# Patient Record
Sex: MALE | Race: WHITE | NOT HISPANIC OR LATINO | URBAN - METROPOLITAN AREA
[De-identification: names, ages, dates, MRNs, and addresses within clinical notes are randomized per-mention and may not be internally consistent; named-entity substitution may affect disease eponyms.]

---

## 2023-01-04 ENCOUNTER — OFFICE VISIT (OUTPATIENT)
Dept: URBAN - METROPOLITAN AREA CLINIC 10 | Facility: CLINIC | Age: 83
End: 2023-01-04
Payer: MEDICARE

## 2023-01-04 DIAGNOSIS — H35.3112 NONEXUDATIVE MACULAR DEGENERATION, INTERMEDIATE DRY STAGE, RIGHT EYE: ICD-10-CM

## 2023-01-04 DIAGNOSIS — H35.3124 NONEXUDATIVE MACULAR DEGENERATION, ADVANCED ATROPHIC WITH SUBFOVEAL INVOLVEMENT, LEFT EYE: ICD-10-CM

## 2023-01-04 DIAGNOSIS — H26.491 OTHER SECONDARY CATARACT, RIGHT EYE: Primary | ICD-10-CM

## 2023-01-04 PROCEDURE — 92134 CPTRZ OPH DX IMG PST SGM RTA: CPT | Performed by: OPTOMETRIST

## 2023-01-04 PROCEDURE — 99204 OFFICE O/P NEW MOD 45 MIN: CPT | Performed by: OPTOMETRIST

## 2023-01-04 ASSESSMENT — INTRAOCULAR PRESSURE
OD: 8
OS: 9

## 2023-01-04 ASSESSMENT — KERATOMETRY
OD: 41.50
OS: 41.38

## 2023-01-04 ASSESSMENT — VISUAL ACUITY
OD: 20/70
OS: 20/50

## 2023-01-04 NOTE — IMPRESSION/PLAN
Impression: Nonexudative macular degeneration, advanced atrophic with subfoveal involvement, left eye: H35.3166. Plan: Patient educated regarding findings. Recommend patient take an AREDS formula multiple vitamin daily PO. Recommend no smoking, increasing leafy green vegetables in diet, and UV protection. Recommend monocular home monitoring. Observation is all that is indicated at this time. RTC 1 year for DFE and possible MAC OCT or sooner with any sudden change in vision.

## 2023-01-04 NOTE — IMPRESSION/PLAN
Impression: Nonexudative macular degeneration, intermediate dry stage, right eye: H35.0764. Plan: Patient educated regarding findings. Recommend patient take an AREDS formula multiple vitamin daily PO. Recommend no smoking, increasing leafy green vegetables in diet, and UV protection. Recommend monocular home monitoring. Observation is all that is indicated at this time. RTC 1 year for DFE and possible MAC OCT or sooner with any sudden change in vision.